# Patient Record
Sex: FEMALE | Race: ASIAN | NOT HISPANIC OR LATINO
[De-identification: names, ages, dates, MRNs, and addresses within clinical notes are randomized per-mention and may not be internally consistent; named-entity substitution may affect disease eponyms.]

---

## 2017-05-07 ENCOUNTER — HOSPITAL (OUTPATIENT)
Dept: OTHER | Age: 32
End: 2017-05-07
Attending: EMERGENCY MEDICINE

## 2017-05-07 LAB
ANALYZER ANC (IANC): ABNORMAL
ANION GAP SERPL CALC-SCNC: 20 MMOL/L (ref 10–20)
BASOPHILS # BLD: 0 THOUSAND/MCL (ref 0–0.3)
BASOPHILS NFR BLD: 0 %
BUN SERPL-MCNC: 6 MG/DL (ref 6–20)
BUN/CREAT SERPL: 8 (ref 7–25)
CALCIUM SERPL-MCNC: 9.3 MG/DL (ref 8.4–10.2)
CHLORIDE: 103 MMOL/L (ref 98–107)
CO2 SERPL-SCNC: 20 MMOL/L (ref 21–32)
CREAT SERPL-MCNC: 0.74 MG/DL (ref 0.51–0.95)
DIFFERENTIAL METHOD BLD: ABNORMAL
EOSINOPHIL # BLD: 0 THOUSAND/MCL (ref 0.1–0.5)
EOSINOPHIL NFR BLD: 0 %
ERYTHROCYTE [DISTWIDTH] IN BLOOD: 13.1 % (ref 11–15)
GLUCOSE SERPL-MCNC: 102 MG/DL (ref 65–99)
HCG POINT OF CARE (5HGRST): NEGATIVE
HEMATOCRIT: 36.4 % (ref 36–46.5)
HG POINT OF CARE QC: NORMAL
HGB BLD-MCNC: 12.5 GM/DL (ref 12–15.5)
LYMPHOCYTES # BLD: 1.5 THOUSAND/MCL (ref 1–4.8)
LYMPHOCYTES NFR BLD: 11 %
MCH RBC QN AUTO: 30 PG (ref 26–34)
MCHC RBC AUTO-ENTMCNC: 34.3 GM/DL (ref 32–36.5)
MCV RBC AUTO: 87.3 FL (ref 78–100)
MONOCYTES # BLD: 0.5 THOUSAND/MCL (ref 0.3–0.9)
MONOCYTES NFR BLD: 4 %
NEUTROPHILS # BLD: 10.9 THOUSAND/MCL (ref 1.8–7.7)
NEUTROPHILS NFR BLD: 85 %
NEUTS SEG NFR BLD: ABNORMAL %
PERCENT NRBC: ABNORMAL
PLATELET # BLD: 502 THOUSAND/MCL (ref 140–450)
POTASSIUM SERPL-SCNC: 4 MMOL/L (ref 3.4–5.1)
RBC # BLD: 4.17 MILLION/MCL (ref 4–5.2)
SODIUM SERPL-SCNC: 139 MMOL/L (ref 135–145)
WBC # BLD: 12.9 THOUSAND/MCL (ref 4.2–11)

## 2019-09-22 ENCOUNTER — HOSPITAL (OUTPATIENT)
Dept: OTHER | Age: 34
End: 2019-09-22

## 2019-09-22 LAB
ALBUMIN SERPL-MCNC: 4.3 G/DL (ref 3.6–5.1)
ALBUMIN/GLOB SERPL: 1.1 {RATIO} (ref 1–2.4)
ALP SERPL-CCNC: 46 UNITS/L (ref 45–117)
ALT SERPL-CCNC: 20 UNITS/L
ANALYZER ANC (IANC): ABNORMAL
ANION GAP SERPL CALC-SCNC: 12 MMOL/L (ref 10–20)
AST SERPL-CCNC: 18 UNITS/L
BASOPHILS # BLD: 0.1 K/MCL (ref 0–0.3)
BASOPHILS NFR BLD: 1 %
BILIRUB SERPL-MCNC: 0.6 MG/DL (ref 0.2–1)
BUN SERPL-MCNC: 8 MG/DL (ref 6–20)
BUN/CREAT SERPL: 10 (ref 7–25)
CALCIUM SERPL-MCNC: 9.6 MG/DL (ref 8.4–10.2)
CHLORIDE SERPL-SCNC: 106 MMOL/L (ref 98–107)
CO2 SERPL-SCNC: 25 MMOL/L (ref 21–32)
CREAT SERPL-MCNC: 0.81 MG/DL (ref 0.51–0.95)
DIFFERENTIAL METHOD BLD: ABNORMAL
EOSINOPHIL # BLD: 0.2 K/MCL (ref 0.1–0.5)
EOSINOPHIL NFR BLD: 2 %
ERYTHROCYTE [DISTWIDTH] IN BLOOD: 12.9 % (ref 11–15)
GLOBULIN SER-MCNC: 4 G/DL (ref 2–4)
GLUCOSE SERPL-MCNC: 90 MG/DL (ref 65–99)
HCG SERPL QL: NEGATIVE
HCT VFR BLD CALC: 39.9 % (ref 36–46.5)
HGB BLD-MCNC: 13.2 G/DL (ref 12–15.5)
IMM GRANULOCYTES # BLD AUTO: 0 K/MCL (ref 0–0.2)
IMM GRANULOCYTES NFR BLD: 0 %
LACTATE BLDV-SCNC: 0.8 MMOL/L (ref 0–2)
LIPASE SERPL-CCNC: 126 UNITS/L (ref 73–393)
LYMPHOCYTES # BLD: 2.7 K/MCL (ref 1–4.8)
LYMPHOCYTES NFR BLD: 30 %
MCH RBC QN AUTO: 29.1 PG (ref 26–34)
MCHC RBC AUTO-ENTMCNC: 33.1 G/DL (ref 32–36.5)
MCV RBC AUTO: 88.1 FL (ref 78–100)
MONOCYTES # BLD: 0.6 K/MCL (ref 0.3–0.9)
MONOCYTES NFR BLD: 6 %
NEUTROPHILS # BLD: 5.3 K/MCL (ref 1.8–7.7)
NEUTROPHILS NFR BLD: 61 %
NEUTS SEG NFR BLD: ABNORMAL %
NRBC (NRBCRE): 0 /100 WBC
PLATELET # BLD: 519 K/MCL (ref 140–450)
POTASSIUM SERPL-SCNC: 4 MMOL/L (ref 3.4–5.1)
PROT SERPL-MCNC: 8.3 G/DL (ref 6.4–8.2)
RBC # BLD: 4.53 MIL/MCL (ref 4–5.2)
SODIUM SERPL-SCNC: 139 MMOL/L (ref 135–145)
WBC # BLD: 8.8 K/MCL (ref 4.2–11)

## 2019-09-22 PROCEDURE — 99284 EMERGENCY DEPT VISIT MOD MDM: CPT | Performed by: EMERGENCY MEDICINE

## 2021-01-08 ENCOUNTER — TELEPHONE (OUTPATIENT)
Dept: OBGYN | Age: 36
End: 2021-01-08

## 2021-01-08 ENCOUNTER — OFFICE VISIT (OUTPATIENT)
Dept: OBGYN | Age: 36
End: 2021-01-08

## 2021-01-08 DIAGNOSIS — N90.89 VULVAR IRRITATION: Primary | ICD-10-CM

## 2021-01-08 PROCEDURE — 99203 OFFICE O/P NEW LOW 30 MIN: CPT | Performed by: OBSTETRICS & GYNECOLOGY

## 2021-01-08 RX ORDER — CLOBETASOL PROPIONATE 0.5 MG/G
OINTMENT TOPICAL
Qty: 30 G | Refills: 1 | Status: SHIPPED | OUTPATIENT
Start: 2021-01-08 | End: 2021-01-08 | Stop reason: DRUGHIGH

## 2021-01-08 RX ORDER — SPIRONOLACTONE 25 MG/1
1 TABLET ORAL
COMMUNITY
Start: 2019-05-14

## 2021-01-08 RX ORDER — TRETINOIN 0.05 G/100G
GEL TOPICAL
COMMUNITY
Start: 2020-11-09

## 2021-01-08 RX ORDER — CLOBETASOL PROPIONATE 0.5 MG/G
OINTMENT TOPICAL
Qty: 30 G | Refills: 1 | Status: SHIPPED | OUTPATIENT
Start: 2021-01-08

## 2021-01-08 RX ORDER — CLINDAMYCIN PHOSPHATE 10 MG/G
GEL TOPICAL
COMMUNITY
Start: 2019-05-13

## 2021-01-08 SDOH — HEALTH STABILITY: MENTAL HEALTH: HOW OFTEN DO YOU HAVE A DRINK CONTAINING ALCOHOL?: NEVER

## 2021-01-08 ASSESSMENT — PAIN SCALES - GENERAL: PAINLEVEL: 0

## 2021-05-25 VITALS
HEIGHT: 59 IN | RESPIRATION RATE: 16 BRPM | TEMPERATURE: 98.3 F | HEART RATE: 94 BPM | BODY MASS INDEX: 24.6 KG/M2 | SYSTOLIC BLOOD PRESSURE: 98 MMHG | WEIGHT: 122 LBS | OXYGEN SATURATION: 100 % | DIASTOLIC BLOOD PRESSURE: 62 MMHG

## 2023-09-01 ENCOUNTER — HOSPITAL ENCOUNTER (OUTPATIENT)
Dept: GENERAL RADIOLOGY | Facility: HOSPITAL | Age: 38
Discharge: HOME | End: 2023-09-01
Attending: INTERNAL MEDICINE
Payer: COMMERCIAL

## 2023-09-01 ENCOUNTER — OFFICE VISIT (OUTPATIENT)
Dept: RHEUMATOLOGY | Facility: CLINIC | Age: 38
End: 2023-09-01
Payer: COMMERCIAL

## 2023-09-01 ENCOUNTER — LAB ENCOUNTER (OUTPATIENT)
Dept: LAB | Age: 38
End: 2023-09-01
Attending: INTERNAL MEDICINE
Payer: COMMERCIAL

## 2023-09-01 VITALS
BODY MASS INDEX: 23.66 KG/M2 | SYSTOLIC BLOOD PRESSURE: 102 MMHG | HEIGHT: 59 IN | OXYGEN SATURATION: 99 % | DIASTOLIC BLOOD PRESSURE: 76 MMHG | TEMPERATURE: 98 F | WEIGHT: 117.38 LBS | RESPIRATION RATE: 16 BRPM | HEART RATE: 69 BPM

## 2023-09-01 DIAGNOSIS — G89.29 CHRONIC PAIN OF BOTH KNEES: ICD-10-CM

## 2023-09-01 DIAGNOSIS — M25.562 CHRONIC PAIN OF BOTH KNEES: ICD-10-CM

## 2023-09-01 DIAGNOSIS — H04.123 DRY EYES: ICD-10-CM

## 2023-09-01 DIAGNOSIS — R70.0 ELEVATED SED RATE: Primary | ICD-10-CM

## 2023-09-01 DIAGNOSIS — R79.82 ELEVATED C-REACTIVE PROTEIN (CRP): ICD-10-CM

## 2023-09-01 DIAGNOSIS — M53.3 CHRONIC SI JOINT PAIN: ICD-10-CM

## 2023-09-01 DIAGNOSIS — M25.561 CHRONIC PAIN OF BOTH KNEES: ICD-10-CM

## 2023-09-01 DIAGNOSIS — M54.2 NECK PAIN: ICD-10-CM

## 2023-09-01 DIAGNOSIS — G89.29 CHRONIC SI JOINT PAIN: ICD-10-CM

## 2023-09-01 DIAGNOSIS — R70.0 ELEVATED SED RATE: ICD-10-CM

## 2023-09-01 PROBLEM — M54.16 LUMBAR RADICULOPATHY: Status: ACTIVE | Noted: 2023-02-15

## 2023-09-01 PROBLEM — M51.26 LUMBAR DISC HERNIATION: Status: ACTIVE | Noted: 2022-12-22

## 2023-09-01 PROBLEM — M53.2X6 LUMBAR SPINE INSTABILITY: Status: ACTIVE | Noted: 2023-02-15

## 2023-09-01 PROBLEM — M54.41 CHRONIC BILATERAL LOW BACK PAIN WITH BILATERAL SCIATICA: Status: ACTIVE | Noted: 2023-02-15

## 2023-09-01 PROBLEM — R29.898 DECONDITIONED LOW BACK: Status: ACTIVE | Noted: 2023-02-15

## 2023-09-01 PROBLEM — M54.42 CHRONIC BILATERAL LOW BACK PAIN WITH BILATERAL SCIATICA: Status: ACTIVE | Noted: 2023-02-15

## 2023-09-01 PROBLEM — M43.16 SPONDYLOLISTHESIS AT L4-L5 LEVEL: Status: ACTIVE | Noted: 2023-02-15

## 2023-09-01 LAB
CK SERPL-CCNC: 128 U/L
CRP SERPL-MCNC: 0.42 MG/DL (ref ?–0.3)
ERYTHROCYTE [SEDIMENTATION RATE] IN BLOOD: 23 MM/HR

## 2023-09-01 PROCEDURE — 3074F SYST BP LT 130 MM HG: CPT | Performed by: INTERNAL MEDICINE

## 2023-09-01 PROCEDURE — 72202 X-RAY EXAM SI JOINTS 3/> VWS: CPT | Performed by: INTERNAL MEDICINE

## 2023-09-01 PROCEDURE — 3008F BODY MASS INDEX DOCD: CPT | Performed by: INTERNAL MEDICINE

## 2023-09-01 PROCEDURE — 36415 COLL VENOUS BLD VENIPUNCTURE: CPT

## 2023-09-01 PROCEDURE — 72052 X-RAY EXAM NECK SPINE 6/>VWS: CPT | Performed by: INTERNAL MEDICINE

## 2023-09-01 PROCEDURE — 83521 IG LIGHT CHAINS FREE EACH: CPT

## 2023-09-01 PROCEDURE — 82164 ANGIOTENSIN I ENZYME TEST: CPT

## 2023-09-01 PROCEDURE — 86334 IMMUNOFIX E-PHORESIS SERUM: CPT

## 2023-09-01 PROCEDURE — 84165 PROTEIN E-PHORESIS SERUM: CPT

## 2023-09-01 PROCEDURE — 82550 ASSAY OF CK (CPK): CPT

## 2023-09-01 PROCEDURE — 85652 RBC SED RATE AUTOMATED: CPT

## 2023-09-01 PROCEDURE — 71046 X-RAY EXAM CHEST 2 VIEWS: CPT | Performed by: INTERNAL MEDICINE

## 2023-09-01 PROCEDURE — 86038 ANTINUCLEAR ANTIBODIES: CPT

## 2023-09-01 PROCEDURE — 81374 HLA I TYPING 1 ANTIGEN LR: CPT

## 2023-09-01 PROCEDURE — 85549 MURAMIDASE: CPT

## 2023-09-01 PROCEDURE — 3078F DIAST BP <80 MM HG: CPT | Performed by: INTERNAL MEDICINE

## 2023-09-01 PROCEDURE — 86140 C-REACTIVE PROTEIN: CPT

## 2023-09-01 PROCEDURE — 99205 OFFICE O/P NEW HI 60 MIN: CPT | Performed by: INTERNAL MEDICINE

## 2023-09-01 RX ORDER — PANTOPRAZOLE SODIUM 40 MG/1
40 TABLET, DELAYED RELEASE ORAL
COMMUNITY
Start: 2023-06-05

## 2023-09-01 RX ORDER — CYCLOBENZAPRINE HCL 5 MG
1 TABLET ORAL NIGHTLY PRN
COMMUNITY
Start: 2022-07-09

## 2023-09-01 RX ORDER — ISOTRETINOIN 20 MG/1
20 CAPSULE, GELATIN COATED ORAL
COMMUNITY
Start: 2023-08-23

## 2023-09-01 RX ORDER — OMEGA-3 FATTY ACIDS/FISH OIL 300-1000MG
CAPSULE ORAL
COMMUNITY

## 2023-09-05 LAB
ACE: 28 U/L
NUCLEAR IGG TITR SER IF: NEGATIVE {TITER}

## 2023-09-06 LAB
ALBUMIN SERPL ELPH-MCNC: 4.62 G/DL (ref 3.75–5.21)
ALBUMIN/GLOB SERPL: 1.66 {RATIO} (ref 1–2)
ALPHA1 GLOB SERPL ELPH-MCNC: 0.26 G/DL (ref 0.19–0.46)
ALPHA2 GLOB SERPL ELPH-MCNC: 0.73 G/DL (ref 0.48–1.05)
B-GLOBULIN SERPL ELPH-MCNC: 0.8 G/DL (ref 0.68–1.23)
GAMMA GLOB SERPL ELPH-MCNC: 1 G/DL (ref 0.62–1.7)
KAPPA LC FREE SER-MCNC: 1.39 MG/DL (ref 0.33–1.94)
KAPPA LC FREE/LAMBDA FREE SER NEPH: 1.42 {RATIO} (ref 0.26–1.65)
LAMBDA LC FREE SERPL-MCNC: 0.98 MG/DL (ref 0.57–2.63)
LYSOZYME: 4 UG/ML
PROT SERPL-MCNC: 7.4 G/DL (ref 6.4–8.2)

## 2023-09-11 LAB — HLA-B27: NEGATIVE

## 2023-09-13 ENCOUNTER — TELEPHONE (OUTPATIENT)
Dept: RHEUMATOLOGY | Facility: CLINIC | Age: 38
End: 2023-09-13

## 2023-09-13 DIAGNOSIS — M51.36 DDD (DEGENERATIVE DISC DISEASE), LUMBAR: ICD-10-CM

## 2023-09-13 DIAGNOSIS — M50.30 DDD (DEGENERATIVE DISC DISEASE), CERVICAL: ICD-10-CM

## 2023-09-13 DIAGNOSIS — Z86.2 HISTORY OF THROMBOCYTOSIS: ICD-10-CM

## 2023-09-13 DIAGNOSIS — R70.0 ELEVATED SED RATE: Primary | ICD-10-CM

## 2024-03-13 ENCOUNTER — OFFICE VISIT (OUTPATIENT)
Dept: RHEUMATOLOGY | Facility: CLINIC | Age: 39
End: 2024-03-13
Payer: COMMERCIAL

## 2024-03-13 VITALS
TEMPERATURE: 98 F | RESPIRATION RATE: 18 BRPM | WEIGHT: 116.38 LBS | DIASTOLIC BLOOD PRESSURE: 74 MMHG | OXYGEN SATURATION: 92 % | BODY MASS INDEX: 23.46 KG/M2 | SYSTOLIC BLOOD PRESSURE: 102 MMHG | HEART RATE: 80 BPM | HEIGHT: 59 IN

## 2024-03-13 DIAGNOSIS — M50.30 DDD (DEGENERATIVE DISC DISEASE), CERVICAL: Primary | ICD-10-CM

## 2024-03-13 DIAGNOSIS — Z86.2 HISTORY OF THROMBOCYTOSIS: ICD-10-CM

## 2024-03-13 DIAGNOSIS — M25.562 CHRONIC PAIN OF BOTH KNEES: ICD-10-CM

## 2024-03-13 DIAGNOSIS — H04.123 DRY EYES: ICD-10-CM

## 2024-03-13 DIAGNOSIS — G89.29 CHRONIC PAIN OF BOTH KNEES: ICD-10-CM

## 2024-03-13 DIAGNOSIS — M25.561 CHRONIC PAIN OF BOTH KNEES: ICD-10-CM

## 2024-03-13 DIAGNOSIS — M51.36 DDD (DEGENERATIVE DISC DISEASE), LUMBAR: ICD-10-CM

## 2024-03-13 PROCEDURE — 99215 OFFICE O/P EST HI 40 MIN: CPT | Performed by: INTERNAL MEDICINE

## 2024-03-13 RX ORDER — TIZANIDINE 4 MG/1
4 TABLET ORAL NIGHTLY PRN
Qty: 30 TABLET | Refills: 0 | Status: SHIPPED | OUTPATIENT
Start: 2024-03-13

## 2024-03-13 NOTE — PROGRESS NOTES
?  RHEUMATOLOGY FOLLOW UP   Date of visit: 03/13/2024  ?  Chief Complaint   Patient presents with    Follow - Up     6M follow up. Right eye hurts and swollen sometimes, since 2M ago. No improvement, no change. Knees are a little better, but not back, Voltaren gel helps for 10-15 days. Would like a dosage increase for the cyclobenzaprine.        ASSESSMENT, DISCUSSION & PLAN   Assessment:  1. DDD (degenerative disc disease), cervical    2. DDD (degenerative disc disease), lumbar    3. History of thrombocytosis    4. Dry eyes    5. Chronic pain of both knees        Discussion:  Ms. Sammi Woods is a 39 yo woman, otherwise fairly healthy who started to develop back pain and has since developed some knee pain and neck pain. She has been diagnosed with DDD of the lower back. As part of her workup, she was found to have elevated inflammatory markers- particularly the sed rate. She has had elevated platelet counts and workup through hematology was negative. Her exam lacks overt signs of synovitis but given her history and labs, it was worth doing a more extensive autoimmune work up. Her testing including HLAB27 was negative.  Since being off accutane, she continues to have back and neck pain. The knee pain is improved with topical OTC voltaren gel.   Her inflammatory markers have normalized yet she continues with leukocytosis and thrombocytosis. Recommended she follow back with hematology regarding this since unlikely autoimmune/inflammatory related  For the pain, she will continue ibuprofen prn.  Will try tizanidine instead of cyclobenzaprine before considering increasing cyclobenzaprine to 7.5mg.   She will consider PM&R evaluation for injections.  Otherwise, recommended she consider low dose duloxetine for help with the chronic pain.  Discussed other dietary changes and supplements that may help with her symptoms overall.   Will also try compounded topical with diclofenac, lidocaine and cyclobenzaprine to see if this  makes a difference.   She will follow up in 6 months or sooner.   Encouraged her to reach out in the meantime with updates.     Patient verbalized understanding of above instructions. No further questions at this time.    Code selection for this visit was based on time spent (40min) on date of service in preparing to see the patient, obtaining and/or reviewing separately obtained history, performing a medically appropriate examination, counseling and educating the patient/family/caregiver, ordering medications or testing, referring and communicating with other healthcare providers, documenting clinical information in the E HR, independently interpreting results and communicating results to the patient/family/caregiver and care coordination with the patient's other providers.    ?  Plan:  Diagnoses and all orders for this visit:    DDD (degenerative disc disease), cervical  -     tiZANidine 4 MG Oral Tab; Take 1 tablet (4 mg total) by mouth nightly as needed.  -     Physical Therapy Referral - [facility]    DDD (degenerative disc disease), lumbar  -     tiZANidine 4 MG Oral Tab; Take 1 tablet (4 mg total) by mouth nightly as needed.    History of thrombocytosis    Dry eyes    Chronic pain of both knees  -     tiZANidine 4 MG Oral Tab; Take 1 tablet (4 mg total) by mouth nightly as needed.          Return in about 6 months (around 9/13/2024).  ?  HPI   Sammi Woods is a 38 year old female with the following active problems who is referred for rheumatologic evaluation due to joint pain and elevated sed rate.    Since her last visit, she feels about the same  Completed accutane at the beginning of January.  Had visited [facility] with worsened lower back pain while visit. Saw a provider there took prednisone 40mg daily x 7 days which helped with the lower back     Feels her knee pain is better overall. Applies voltaren gel 3-4x/day and feels help.  Continues with neck pain  Only taking max 12 tabs per month  Only  taking 3 cyclobenzaprine per month. Denies drowsiness with the medication.     Denies any other joint pain or swelling.   Has noticed some eye lid swelling- happening 6 times over the past 2 months.   Continues with dry eyes and using drops  Continues with hair loss but thinks related to accutane    HPI from initial consultation  referred for rheumatologic evaluation due to joint pain and elevated sed rate.    Developed thrombocytosis a few years ago. They were persistently elevated the following year. As part of the work up, she was found to have elevated CRP and ESR. Was referred to hematology in 2021, work up was negative except persistent elevations but told to follow up with rheumatology.     Hx of chronic low back pain, aggravated when bowling in December of 2021. Had been recommended to have lumbar fusion by multiple surgeons.     Was seen by rheumatology at [place] and told related to the back and did not recommend any further intervention.    In October of 2022, she started to developed some neck pain. Seen by PCP and xrays were normal. Went through PT which some improvements.     Started on accutane 4 months ago for cystic acne. On 20mg and only recently increase to 40mg. States skin worsened 3-4 years ago.   Did have some some rudy-menstrual cycle increased depression and irritability. Very painful period. Does not follow gyne regularly. Has plans to complete accutane after a total of 6 months.   Is having worsened neck pain now. And should shoulder pain but feels more muscular   Developed bilateral knee pain, xrays showed mild OA changes in medial compartment and patellofemoral. Did PT which helped slightly  Is also applying voltaren gel as needed. Denies obvious swelling of the joints. Notes cracking of the left knee in the mornings which helps alleviate the pain.     In the past, for the lower back, she took two rounds of oral steroids as well as injection which helped for some time. Last  injection in March, typically lasts for 7-8m.   Took naproxen at one point which helped.  Now taking ibuprofen as needed but does help.     + morning stiffness in the neck/back- back improves with activity and heating pad, improved with activity, lasts for about 20 minutes.   + sciatica and nerve pain in the legs.   + hair loss/thinning, relates to accutane.   + some cramping and diarrhea, again thought relates to increase in accutane dose. No blood or mucous in stools. No active or daily now.   + prior reflux with taking the other NSAIDs, takes PPI as needed, last took 3-4 months ago for 2 weeks then stopped.   + first canker sore after the GI upset   + dry eyes, worsened lately. Now using systane drops which help as well as gel at night.   + pain can wake from sleep; but also has some difficulty falling asleep.     Denies new skin nodule formation.  The patient denies chronic oral or nasal ulcers, photosensitive rash, elevated or scarring rashes, Raynaud's phenomenon, prior hematologic abnormality (aside from the elevated platelets), prior renal or liver disease, or history of seizures.  Denies hx of pericarditis or pleuritis.   No history of prior blood clot in the legs or lungs, strokes or ischemic phenomenon, or prior miscarriages.  Denies puffy hands, tightening of the skin, nonhealing ulcers on the fingertips, trouble swallowing, or severe acid reflux.  The patient denies any history of uveitis, crampy abdominal pain, constipation, diarrhea, bloody stools, mucus in stools, nodular painful shin bruises, Achilles heel pain or symptoms of enthesitis, psoriatic lesions, spooning or pitting of the nails, or history of dactylitis.  There are no symptoms of severe dry mouth, recurrent cavities, or swelling of the cheeks or under the jawbone.   No fevers, chills, lymphadenopathy, night sweats, unexpected weight loss, easy bruising or bleeding.  Denies chronic sinus infections/disease or epistaxis.  Denies chronic  cough or hemoptysis.     Works from home- has a standing desk.   Tries to walk dailly- typically at least 1 mile twice daily. Does also biking and yoga. Still has pain but is pushing through pain.     Family hx:   Mother with OA (both knees replaced)  Father with MI at age 42 and DDD of lumbar spine    Past Medical History:  History reviewed. No pertinent past medical history.  Past Surgical History:  Past Surgical History:   Procedure Laterality Date          x 2     Family History:  Family History   Problem Relation Age of Onset    Arthritis Mother     Hyperlipidemia Mother     Heart Attack Father     Hypertension Father     Hyperlipidemia Father     Arthritis Father      Social History:  Social History     Socioeconomic History    Marital status:    Tobacco Use    Smoking status: Never    Smokeless tobacco: Never   Vaping Use    Vaping Use: Never used   Substance and Sexual Activity    Alcohol use: Never    Drug use: Never     Medications:  Outpatient Medications Marked as Taking for the 3/13/24 encounter (Office Visit) with Ana Cheng DO   Medication Sig Dispense Refill    tretinoin 0.025 % External Cream       tiZANidine 4 MG Oral Tab Take 1 tablet (4 mg total) by mouth nightly as needed. 30 tablet 0    Ibuprofen 200 MG Oral Cap As needed      pantoprazole 40 MG Oral Tab EC Take 1 tablet (40 mg total) by mouth.       Modified Medications    No medications on file     Medications Discontinued During This Encounter   Medication Reason    ACCUTANE 20 MG Oral Cap     cyclobenzaprine 5 MG Oral Tab      ??  Allergies:  No Known Allergies  ?  REVIEW OF SYSTEMS   ?  Review of Systems   Constitutional:  Positive for malaise/fatigue. Negative for chills and fever.   HENT:  Negative for congestion and nosebleeds.    Eyes:  Positive for pain. Negative for blurred vision and redness.   Respiratory:  Negative for cough, hemoptysis and shortness of breath.    Cardiovascular:  Negative for chest pain,  palpitations and leg swelling.   Gastrointestinal:  Positive for heartburn (intermittently). Negative for abdominal pain, blood in stool, constipation, diarrhea and nausea.   Genitourinary:  Negative for dysuria, frequency, hematuria and urgency.   Musculoskeletal:  Positive for back pain, joint pain, myalgias and neck pain.   Skin:  Negative for itching and rash.   Neurological:  Positive for tingling (sciatcia). Negative for dizziness, seizures, weakness and headaches.   Endo/Heme/Allergies:  Positive for environmental allergies. Does not bruise/bleed easily.   Psychiatric/Behavioral:  Negative for depression. The patient has insomnia (not always due to the pain). The patient is not nervous/anxious.      PHYSICAL EXAM   Today's Vitals:  Temperature Blood Pressure Heart Rate Resp Rate SpO2   Temp: 97.5 °F (36.4 °C) BP: 102/74 Pulse: 80 Resp: 18 SpO2: 92 %   ?  Current Weight Height BMI BSA Pain   Wt Readings from Last 1 Encounters:   03/13/24 116 lb 6.4 oz (52.8 kg)    Height: 4' 11\" (149.9 cm) Body mass index is 23.51 kg/m². Body surface area is 1.47 meters squared.         Physical Exam  Vitals and nursing note reviewed.   Constitutional:       General: She is not in acute distress.     Appearance: Normal appearance. She is well-developed. She is not diaphoretic.   HENT:      Head: Normocephalic.   Eyes:      General: No scleral icterus.     Extraocular Movements: Extraocular movements intact.      Conjunctiva/sclera: Conjunctivae normal.   Neck:      Vascular: No JVD.      Trachea: No tracheal deviation.   Cardiovascular:      Rate and Rhythm: Normal rate and regular rhythm.      Heart sounds: Normal heart sounds. No murmur heard.  Pulmonary:      Effort: Pulmonary effort is normal. No respiratory distress.      Breath sounds: Normal breath sounds. No wheezing.   Musculoskeletal:         General: No swelling, tenderness or deformity.      Cervical back: Neck supple.      Comments: No swelling, tenderness,  redness or restriction of motion of the DIPs, PIPs, MCPs, wrists, elbows, ankles, or joints of the feet.  Bilateral shoulders with full ROM, no evidence of impingement with provocative maneuvers.  Bilateral knees without medial joint line tenderness, no crepitus, no effusion.  (Prior exam-SI joints tender. No tenderness over the greater trochanters. Spinous process tenderness over cervial and lumbar spine. Significant paraspinal muscle tightness/tenderness)   Lymphadenopathy:      Cervical: No cervical adenopathy.   Skin:     General: Skin is warm and dry.      Findings: No erythema or rash.      Comments: No malar rash  No periungal erythema  No fingernail pitting/onycholysis    Neurological:      Mental Status: She is alert and oriented to person, place, and time.      Cranial Nerves: No cranial nerve deficit.      Gait: Gait normal.   Psychiatric:         Mood and Affect: Mood normal.         Behavior: Behavior normal.       ?  Radiology review:      Labs:  No results found for: \"WBC\", \"RBC\", \"HGB\", \"HCT\", \"PLT\", \"MPV\", \"MCV\", \"MCH\", \"MCHC\", \"RDW\", \"NEPRELIM\", \"NEUTABS\", \"LYMPHABS\", \"EOSABS\", \"BASABS\", \"NEUT\", \"LYMPH\", \"MON\", \"EOS\", \"BASO\", \"NEPERCENT\", \"LYPERCENT\", \"MOPERCENT\", \"EOPERCENT\", \"BAPERCENT\", \"NE\", \"LYMABS\", \"MOABSO\", \"EOABSO\", \"BAABSO\"  Lab Results   Component Value Date    TP 7.4 09/01/2023    ALB 4.62 09/01/2023       Additional Labs:  3/2024  CBC with WBC 12.0; hemoglobin 11.7; platelet 553  CMP grossly normal  ESR 7 normal  CRP 2 normal (n<10)    09/01/2023  SPEP grossly negative  HLA-B27 negative  HODA screen negative  Serum ACE normal  Lysozyme normal   normal  ESR 23 borderline elevated  CRP 0.42 borderline elevated    03/2023  CRP 5.1 (N<10)  ESR 39 elevated  Uric acid 4.3 normal   RF negative   B12 385 normal   Vit D 35.3 normal   HODA neg but on IF cytoplasmic autoantibodies present     07/2022  ESR 22 borderline elevated  HODA screen negative   Uric acid 4.6 normal   RF negative      06/2022  ESR 45 elevated  CRP 14.9 (N<10) elevated    06/2022  ESR 44 elevated  CRP 16.1 (N<10) elevated    Ana Cheng DO  [facility]  03/13/2024

## 2024-03-20 ENCOUNTER — PATIENT MESSAGE (OUTPATIENT)
Dept: RHEUMATOLOGY | Facility: CLINIC | Age: 39
End: 2024-03-20

## 2024-03-20 DIAGNOSIS — M54.2 NECK PAIN: ICD-10-CM

## 2024-03-20 DIAGNOSIS — M50.30 DDD (DEGENERATIVE DISC DISEASE), CERVICAL: Primary | ICD-10-CM

## 2024-03-20 DIAGNOSIS — M51.36 DDD (DEGENERATIVE DISC DISEASE), LUMBAR: ICD-10-CM

## 2024-03-20 RX ORDER — CYCLOBENZAPRINE HYDROCHLORIDE 7.5 MG/1
7.5 TABLET, FILM COATED ORAL NIGHTLY PRN
Qty: 90 TABLET | Refills: 0 | Status: SHIPPED | OUTPATIENT
Start: 2024-03-20 | End: 2024-03-21

## 2024-03-21 ENCOUNTER — TELEPHONE (OUTPATIENT)
Dept: RHEUMATOLOGY | Facility: CLINIC | Age: 39
End: 2024-03-21

## 2024-03-21 DIAGNOSIS — M54.2 NECK PAIN: ICD-10-CM

## 2024-03-21 DIAGNOSIS — M50.30 DDD (DEGENERATIVE DISC DISEASE), CERVICAL: ICD-10-CM

## 2024-03-21 DIAGNOSIS — M51.36 DDD (DEGENERATIVE DISC DISEASE), LUMBAR: ICD-10-CM

## 2024-03-21 RX ORDER — CYCLOBENZAPRINE HCL 10 MG
10 TABLET ORAL NIGHTLY PRN
Qty: 30 TABLET | Refills: 0 | Status: SHIPPED | OUTPATIENT
Start: 2024-03-21

## 2024-03-21 NOTE — TELEPHONE ENCOUNTER
Call from [id number], Cyclobenzaprine is not covered by health plan. Must change prescription to 5mg or 10mg tablets.

## 2024-09-25 ENCOUNTER — TELEPHONE (OUTPATIENT)
Dept: RHEUMATOLOGY | Facility: CLINIC | Age: 39
End: 2024-09-25

## 2024-09-25 DIAGNOSIS — M25.561 CHRONIC PAIN OF BOTH KNEES: ICD-10-CM

## 2024-09-25 DIAGNOSIS — R70.0 ELEVATED SED RATE: Primary | ICD-10-CM

## 2024-09-25 DIAGNOSIS — M25.562 CHRONIC PAIN OF BOTH KNEES: ICD-10-CM

## 2024-09-25 DIAGNOSIS — G89.29 CHRONIC PAIN OF BOTH KNEES: ICD-10-CM

## 2024-09-25 DIAGNOSIS — R79.82 ELEVATED C-REACTIVE PROTEIN (CRP): ICD-10-CM

## 2024-09-25 DIAGNOSIS — Z86.2 HISTORY OF THROMBOCYTOSIS: ICD-10-CM

## 2024-09-30 ENCOUNTER — LAB ENCOUNTER (OUTPATIENT)
Dept: LAB | Age: 39
End: 2024-09-30
Attending: INTERNAL MEDICINE
Payer: COMMERCIAL

## 2024-09-30 DIAGNOSIS — Z86.2 HISTORY OF THROMBOCYTOSIS: ICD-10-CM

## 2024-09-30 DIAGNOSIS — M25.561 CHRONIC PAIN OF BOTH KNEES: ICD-10-CM

## 2024-09-30 DIAGNOSIS — G89.29 CHRONIC PAIN OF BOTH KNEES: ICD-10-CM

## 2024-09-30 DIAGNOSIS — R70.0 ELEVATED SED RATE: ICD-10-CM

## 2024-09-30 DIAGNOSIS — M25.562 CHRONIC PAIN OF BOTH KNEES: ICD-10-CM

## 2024-09-30 DIAGNOSIS — R79.82 ELEVATED C-REACTIVE PROTEIN (CRP): ICD-10-CM

## 2024-09-30 LAB
ALBUMIN SERPL-MCNC: 4.9 G/DL (ref 3.2–4.8)
ALBUMIN/GLOB SERPL: 1.9 {RATIO} (ref 1–2)
ALP LIVER SERPL-CCNC: 39 U/L
ALT SERPL-CCNC: 9 U/L
ANION GAP SERPL CALC-SCNC: 6 MMOL/L (ref 0–18)
AST SERPL-CCNC: 15 U/L (ref ?–34)
BASOPHILS # BLD AUTO: 0.08 X10(3) UL (ref 0–0.2)
BASOPHILS NFR BLD AUTO: 0.9 %
BILIRUB SERPL-MCNC: 0.4 MG/DL (ref 0.3–1.2)
BUN BLD-MCNC: 14 MG/DL (ref 9–23)
CALCIUM BLD-MCNC: 10.3 MG/DL (ref 8.7–10.4)
CHLORIDE SERPL-SCNC: 105 MMOL/L (ref 98–112)
CO2 SERPL-SCNC: 26 MMOL/L (ref 21–32)
CREAT BLD-MCNC: 0.85 MG/DL
CRP SERPL-MCNC: <0.4 MG/DL (ref ?–0.5)
EGFRCR SERPLBLD CKD-EPI 2021: 89 ML/MIN/1.73M2 (ref 60–?)
EOSINOPHIL # BLD AUTO: 0.17 X10(3) UL (ref 0–0.7)
EOSINOPHIL NFR BLD AUTO: 2 %
ERYTHROCYTE [DISTWIDTH] IN BLOOD BY AUTOMATED COUNT: 13.2 %
ERYTHROCYTE [SEDIMENTATION RATE] IN BLOOD: 13 MM/HR
FASTING STATUS PATIENT QL REPORTED: NO
GLOBULIN PLAS-MCNC: 2.6 G/DL (ref 2–3.5)
GLUCOSE BLD-MCNC: 94 MG/DL (ref 70–99)
HCT VFR BLD AUTO: 35.3 %
HGB BLD-MCNC: 12.2 G/DL
IMM GRANULOCYTES # BLD AUTO: 0.02 X10(3) UL (ref 0–1)
IMM GRANULOCYTES NFR BLD: 0.2 %
LYMPHOCYTES # BLD AUTO: 2.86 X10(3) UL (ref 1–4)
LYMPHOCYTES NFR BLD AUTO: 32.8 %
MCH RBC QN AUTO: 29.9 PG (ref 26–34)
MCHC RBC AUTO-ENTMCNC: 34.6 G/DL (ref 31–37)
MCV RBC AUTO: 86.5 FL
MONOCYTES # BLD AUTO: 0.53 X10(3) UL (ref 0.1–1)
MONOCYTES NFR BLD AUTO: 6.1 %
NEUTROPHILS # BLD AUTO: 5.05 X10 (3) UL (ref 1.5–7.7)
NEUTROPHILS # BLD AUTO: 5.05 X10(3) UL (ref 1.5–7.7)
NEUTROPHILS NFR BLD AUTO: 58 %
OSMOLALITY SERPL CALC.SUM OF ELEC: 284 MOSM/KG (ref 275–295)
PLATELET # BLD AUTO: 508 10(3)UL (ref 150–450)
POTASSIUM SERPL-SCNC: 4.1 MMOL/L (ref 3.5–5.1)
PROT SERPL-MCNC: 7.5 G/DL (ref 5.7–8.2)
RBC # BLD AUTO: 4.08 X10(6)UL
SODIUM SERPL-SCNC: 137 MMOL/L (ref 136–145)
WBC # BLD AUTO: 8.7 X10(3) UL (ref 4–11)

## 2024-09-30 PROCEDURE — 85025 COMPLETE CBC W/AUTO DIFF WBC: CPT

## 2024-09-30 PROCEDURE — 85652 RBC SED RATE AUTOMATED: CPT

## 2024-09-30 PROCEDURE — 36415 COLL VENOUS BLD VENIPUNCTURE: CPT

## 2024-09-30 PROCEDURE — 80053 COMPREHEN METABOLIC PANEL: CPT

## 2024-09-30 PROCEDURE — 86140 C-REACTIVE PROTEIN: CPT

## 2024-10-02 ENCOUNTER — OFFICE VISIT (OUTPATIENT)
Dept: RHEUMATOLOGY | Facility: CLINIC | Age: 39
End: 2024-10-02
Payer: COMMERCIAL

## 2024-10-02 VITALS
TEMPERATURE: 98 F | OXYGEN SATURATION: 99 % | SYSTOLIC BLOOD PRESSURE: 92 MMHG | DIASTOLIC BLOOD PRESSURE: 60 MMHG | WEIGHT: 113 LBS | BODY MASS INDEX: 22.78 KG/M2 | HEIGHT: 59 IN | HEART RATE: 81 BPM | RESPIRATION RATE: 18 BRPM

## 2024-10-02 DIAGNOSIS — M25.562 CHRONIC PAIN OF BOTH KNEES: ICD-10-CM

## 2024-10-02 DIAGNOSIS — Z86.2 HISTORY OF THROMBOCYTOSIS: ICD-10-CM

## 2024-10-02 DIAGNOSIS — M25.561 CHRONIC PAIN OF BOTH KNEES: ICD-10-CM

## 2024-10-02 DIAGNOSIS — M51.362 DEGENERATION OF INTERVERTEBRAL DISC OF LUMBAR REGION WITH DISCOGENIC BACK PAIN AND LOWER EXTREMITY PAIN: Primary | ICD-10-CM

## 2024-10-02 DIAGNOSIS — H04.123 DRY EYES: ICD-10-CM

## 2024-10-02 DIAGNOSIS — G89.29 CHRONIC PAIN OF BOTH KNEES: ICD-10-CM

## 2024-10-02 DIAGNOSIS — M50.30 DDD (DEGENERATIVE DISC DISEASE), CERVICAL: ICD-10-CM

## 2024-10-02 PROCEDURE — 99214 OFFICE O/P EST MOD 30 MIN: CPT | Performed by: INTERNAL MEDICINE

## 2024-10-02 RX ORDER — SPIRONOLACTONE 25 MG/1
25 TABLET ORAL DAILY
COMMUNITY

## 2024-10-02 NOTE — PROGRESS NOTES
?  RHEUMATOLOGY FOLLOW UP   Date of visit: 10/02/2024  ?  Chief Complaint   Patient presents with    Follow - Up     DDD 6 month follow up patient states follow up due to markers were high   Rapid 3 score: 3.3       ASSESSMENT, DISCUSSION & PLAN   Assessment:  1. Degeneration of intervertebral disc of lumbar region with discogenic back pain and lower extremity pain    2. DDD (degenerative disc disease), cervical    3. Chronic pain of both knees    4. History of thrombocytosis    5. Dry eyes        Discussion:  Ms. Sammi Woods is a 39 yo woman, otherwise fairly healthy who started to develop back pain and has since developed some knee pain and neck pain. She has been diagnosed with DDD of the lower back. As part of her workup, she was found to have elevated inflammatory markers- particularly the sed rate. She has had elevated platelet counts and workup through hematology was negative. Her exam lacks overt signs of synovitis but given her history and labs, it was worth doing a more extensive autoimmune work up. Her testing including HLAB27 was negative.  Since being off accutane, she continues to have back and neck pain. The knee pain is improved with topical OTC voltaren gel.   Her inflammatory markers have normalized. Her leukocytosis has resolved but still has thrombocytosis which has irmproved. Previously recommended she follow back with hematology regarding this since unlikely autoimmune/inflammatory related  For the pain, she will continue ibuprofen prn.  Continue cyclobenzaprine 10mg as needed (had side effects with tizanidine of feeling weak and as if she was breathing slowly which triggered some anxiety).   She may want to consider low dose antidepressant in the future but pt feels like her pain overall is manageable. Is going to avoid injections or surgery for now but open to this in the future.  Will try compounded topical with diclofenac, lidocaine and cyclobenzaprine to see if this makes a difference.  She can also consider a topical CBD/THC cream to see if the topical formulation can help without altering mentation.   Okay to follow up in one year but encouraged her to reach out if symptoms worsen. Also with update on if topical is helping.   Encouraged her to reach out in the meantime with updates.   Also, pt encouraged to consider alternative medicine for pain relief.     Patient verbalized understanding of above instructions. No further questions at this time.    Code selection for this visit was based on time spent (30min) on date of service in preparing to see the patient, obtaining and/or reviewing separately obtained history, performing a medically appropriate examination, counseling and educating the patient/family/caregiver, ordering medications or testing, referring and communicating with other healthcare providers, documenting clinical information in the E HR, independently interpreting results and communicating results to the patient/family/caregiver and care coordination with the patient's other providers.    ?  Plan:  Diagnoses and all orders for this visit:    Degeneration of intervertebral disc of lumbar region with discogenic back pain and lower extremity pain    DDD (degenerative disc disease), cervical    Chronic pain of both knees    History of thrombocytosis    Dry eyes            Return in about 1 year (around 10/2/2025).  ?  HPI   Sammi Woods is a 38 year old female with the following active problems who is referred for rheumatologic evaluation due to joint pain and elevated sed rate.    Since her last visit, she feels about the same  Continues with low back pain. Continues with PT exercises taught. Does about 5-6x/week.   Still cannot walk more than a mile.   Still with sciatica bilaterally.   Had done injections over the years.   Tries to avoid medication if able- limits to about 15/month  Has tried CBD topically.     Feels her knee pain is better overall. Applies voltaren gel 3-4x/day  and feels help. Hasn't helped for the back. Can hepl with the occasional neck and shoulder pain.   Only taking max 12 tabs per month  Only taking once weekly cyclobenzaprine.    Denies any other joint pain or swelling.   Denies skin rashes.   Warm compresses has been helping with eye lid swelling and dry eyes (not using drops)  Denies dry mouth  Continues with hair loss but thought related to Accutane. Started on spironolactone 25mg daily to see if help with hair loss.     HPI from initial consultation  referred for rheumatologic evaluation due to joint pain and elevated sed rate.    Developed thrombocytosis a few years ago. They were persistently elevated the following year. As part of the work up, she was found to have elevated CRP and ESR. Was referred to hematology in 2021, work up was negative except persistent elevations but told to follow up with rheumatology.     Hx of chronic low back pain, aggravated when bowling in December of 2021. Had been recommended to have lumbar fusion by multiple surgeons.     Was seen by rheumatology at [place] and told related to the back and did not recommend any further intervention.    In October of 2022, she started to developed some neck pain. Seen by PCP and xrays were normal. Went through PT which some improvements.     Started on accutane 4 months ago for cystic acne. On 20mg and only recently increase to 40mg. States skin worsened 3-4 years ago.   Did have some some rudy-menstrual cycle increased depression and irritability. Very painful period. Does not follow gyne regularly. Has plans to complete accutane after a total of 6 months.   Is having worsened neck pain now. And should shoulder pain but feels more muscular   Developed bilateral knee pain, xrays showed mild OA changes in medial compartment and patellofemoral. Did PT which helped slightly  Is also applying voltaren gel as needed. Denies obvious swelling of the joints. Notes cracking of the left knee in  the mornings which helps alleviate the pain.     In the past, for the lower back, she took two rounds of oral steroids as well as injection which helped for some time. Last injection in March, typically lasts for 7-8m.   Took naproxen at one point which helped.  Now taking ibuprofen as needed but does help.     + morning stiffness in the neck/back- back improves with activity and heating pad, improved with activity, lasts for about 20 minutes.   + sciatica and nerve pain in the legs.   + hair loss/thinning, relates to accutane.   + some cramping and diarrhea, again thought relates to increase in accutane dose. No blood or mucous in stools. No active or daily now.   + prior reflux with taking the other NSAIDs, takes PPI as needed, last took 3-4 months ago for 2 weeks then stopped.   + first canker sore after the GI upset   + dry eyes, worsened lately. Now using systane drops which help as well as gel at night.   + pain can wake from sleep; but also has some difficulty falling asleep.     Denies new skin nodule formation.  The patient denies chronic oral or nasal ulcers, photosensitive rash, elevated or scarring rashes, Raynaud's phenomenon, prior hematologic abnormality (aside from the elevated platelets), prior renal or liver disease, or history of seizures.  Denies hx of pericarditis or pleuritis.   No history of prior blood clot in the legs or lungs, strokes or ischemic phenomenon, or prior miscarriages.  Denies puffy hands, tightening of the skin, nonhealing ulcers on the fingertips, trouble swallowing, or severe acid reflux.  The patient denies any history of uveitis, crampy abdominal pain, constipation, diarrhea, bloody stools, mucus in stools, nodular painful shin bruises, Achilles heel pain or symptoms of enthesitis, psoriatic lesions, spooning or pitting of the nails, or history of dactylitis.  There are no symptoms of severe dry mouth, recurrent cavities, or swelling of the cheeks or under the jawbone.   No  fevers, chills, lymphadenopathy, night sweats, unexpected weight loss, easy bruising or bleeding.  Denies chronic sinus infections/disease or epistaxis.  Denies chronic cough or hemoptysis.     Works from home- has a standing desk.   Tries to walk dailly- typically at least 1 mile twice daily. Does also biking and yoga. Still has pain but is pushing through pain.     Family hx:   Mother with OA (both knees replaced)  Father with MI at age 42 and DDD of lumbar spine    Past Medical History:  History reviewed. No pertinent past medical history.  Past Surgical History:  Past Surgical History:   Procedure Laterality Date          x 2     Family History:  Family History   Problem Relation Age of Onset    Arthritis Mother     Hyperlipidemia Mother     Heart Attack Father     Hypertension Father     Hyperlipidemia Father     Arthritis Father      Social History:  Social History     Socioeconomic History    Marital status:    Tobacco Use    Smoking status: Never    Smokeless tobacco: Never   Vaping Use    Vaping status: Never Used   Substance and Sexual Activity    Alcohol use: Never    Drug use: Never     Social Determinants of Health      [facility]    Housing Stability     Medications:  Outpatient Medications Marked as Taking for the 10/2/24 encounter (Office Visit) with Ana Cheng DO   Medication Sig Dispense Refill    spironolactone 25 MG Oral Tab Take 1 tablet (25 mg total) by mouth daily.      Cyclobenzaprine HCl 10 MG Oral Tab Take 1 tablet (10 mg total) by mouth nightly as needed. 30 tablet 0    Ibuprofen 200 MG Oral Cap As needed      pantoprazole 40 MG Oral Tab EC Take 1 tablet (40 mg total) by mouth.       Modified Medications    No medications on file     Medications Discontinued During This Encounter   Medication Reason    tretinoin 0.025 % External Cream Therapy completed     ??  Allergies:  No Known Allergies  ?  REVIEW OF  SYSTEMS   ?  Review of Systems   Constitutional:  Positive for malaise/fatigue. Negative for chills and fever.   HENT:  Negative for congestion and nosebleeds.    Eyes:  Positive for pain. Negative for blurred vision and redness.   Respiratory:  Negative for cough, hemoptysis and shortness of breath.    Cardiovascular:  Negative for chest pain, palpitations and leg swelling.   Gastrointestinal:  Positive for heartburn (intermittently). Negative for abdominal pain, blood in stool, constipation, diarrhea and nausea.   Genitourinary:  Negative for dysuria, frequency, hematuria and urgency.   Musculoskeletal:  Positive for back pain, joint pain, myalgias and neck pain.   Skin:  Negative for itching and rash.   Neurological:  Positive for tingling (sciatcia). Negative for dizziness, seizures, weakness and headaches.   Endo/Heme/Allergies:  Positive for environmental allergies. Does not bruise/bleed easily.   Psychiatric/Behavioral:  Negative for depression. The patient has insomnia (not always due to the pain). The patient is not nervous/anxious.      PHYSICAL EXAM   Today's Vitals:  Temperature Blood Pressure Heart Rate Resp Rate SpO2   Temp: 98.4 °F (36.9 °C) BP: 92/60 Pulse: 81 Resp: 18 SpO2: 99 %   ?  Current Weight Height BMI BSA Pain   Wt Readings from Last 1 Encounters:   10/02/24 113 lb (51.3 kg)    Height: 4' 11\" (149.9 cm) Body mass index is 22.82 kg/m². Body surface area is 1.45 meters squared.         Physical Exam  Vitals and nursing note reviewed.   Constitutional:       General: She is not in acute distress.     Appearance: Normal appearance. She is well-developed. She is not diaphoretic.   HENT:      Head: Normocephalic.   Eyes:      General: No scleral icterus.     Extraocular Movements: Extraocular movements intact.      Conjunctiva/sclera: Conjunctivae normal.   Neck:      Vascular: No JVD.      Trachea: No tracheal deviation.   Cardiovascular:      Rate and Rhythm: Normal rate and regular rhythm.       Heart sounds: Normal heart sounds. No murmur heard.  Pulmonary:      Effort: Pulmonary effort is normal. No respiratory distress.      Breath sounds: Normal breath sounds. No wheezing.   Musculoskeletal:         General: No swelling, tenderness or deformity.      Comments: No swelling, tenderness, redness or restriction of motion of the DIPs, PIPs, MCPs, wrists, elbows, ankles, or joints of the feet.  Bilateral shoulders with full ROM, no evidence of impingement with provocative maneuvers.  Bilateral knees without medial joint line tenderness, no crepitus, no effusion.  (Prior exam-SI joints tender. No tenderness over the greater trochanters. Spinous process tenderness over cervial and lumbar spine. Significant paraspinal muscle tightness/tenderness)   Skin:     General: Skin is warm and dry.      Findings: No erythema or rash.      Comments: No malar rash  No periungal erythema  No fingernail pitting/onycholysis    Neurological:      Mental Status: She is alert and oriented to person, place, and time.      Cranial Nerves: No cranial nerve deficit.      Gait: Gait normal.   Psychiatric:         Mood and Affect: Mood normal.         Behavior: Behavior normal.       ?  Radiology review:  PROCEDURE:  XR CHEST PA + LAT CHEST (CPT=71046)     INDICATIONS:  G89.29 Chronic pain of both knees M25.562 Chronic pain of both knees M25.561 Chronic pain of both knees H04.123 Dry eyes R79.82 Elevated C-reactive protein (CR*     COMPARISON:  None.     TECHNIQUE:  PA and lateral chest radiographs were obtained.     PATIENT STATED HISTORY: (As transcribed by Technologist)  Patient offered no additional history at this time.         FINDINGS:    LUNGS:  No focal consolidation.  Normal vascularity.  CARDIAC:  Normal size cardiac silhouette.  MEDIASTINUM:  Normal.  PLEURA:  Normal.  No pleural effusions.  BONES:  Normal for age.                   Impression   CONCLUSION:  There is no evidence of active cardiopulmonary disease.         LOCATION:  [facility]        Dictated by (CST): Rashel Francis MD on 9/01/2023 at 4:20 PM      Finalized by (CST): Rashel Francis MD on 9/01/2023 at 4:21 PM        Narrative   PROCEDURE:  XR SACROILIAC JOINTS (MIN 3 VIEWS) (CPT=72202)     INDICATIONS:  G89.29 Chronic pain of both knees M25.562 Chronic pain of both knees M25.561 Chronic pain of both knees H04.123 Dry eyes R79.82 Elevated C-reactive protein (CR*     COMPARISON:  None.     TECHNIQUE:  Frontal and oblique of the sacroiliac joints were obtained.     PATIENT STATED HISTORY: (As transcribed by Technologist)  Chronic low back pain. No recent injury. History of lumbar spine injuries.         FINDINGS:  Sacroiliac joints are intact.  Bony structures are unremarkable.                   Impression   CONCLUSION:  Unremarkable radiographs of sacroiliac joints.        LOCATION:  [facility]                  Dictated by (CST): Rashel Francis MD on 9/01/2023 at 4:22 PM      Finalized by (CST): Rashel Francis MD on 9/01/2023 at 4:22 PM           Narrative   PROCEDURE:  XR CERVICAL SPINE COMPLETE W/FLEX + EXT (CPT=72052)     TECHNIQUE:  AP, lateral, obliques, coned down view, and flexion/extension views of the spine were obtained.     COMPARISON:  None.     INDICATIONS:  G89.29 Chronic pain of both knees M25.562 Chronic pain of both knees M25.561 Chronic pain of both knees H04.123 Dry eyes R79.82 Elevated C-reactive protein (CR*     PATIENT STATED HISTORY: (As transcribed by Technologist)  Chronic posterior neck pain radiating to left shoulder. Pain becoming more frequent in the past year. No injury.         FINDINGS:      BONES:  Normal.  No significant spondylosis, scoliosis, fracture, or visible bony lesion.  DISC SPACES:  There is mild narrowing at C5-C6.  There is mild encroachment on the left foramen at this level as well.  PARASPINOUS:  Negative.  No paraspinous abnormality is seen.  OTHER:  Negative.           Impression   CONCLUSION:  There is degenerative disc disease  at C5-C6 with mild encroachment on the left foramen.        LOCATION:  [facility]        Dictated by (CST): Rashel Francis MD on 9/01/2023 at 4:21 PM      Finalized by (CST): Rashel Francis MD on 9/01/2023 at 4:22 PM        Labs:  Lab Results   Component Value Date    WBC 8.7 09/30/2024    RBC 4.08 09/30/2024    HGB 12.2 09/30/2024    HCT 35.3 09/30/2024    .0 (H) 09/30/2024    MCV 86.5 09/30/2024    MCH 29.9 09/30/2024    MCHC 34.6 09/30/2024    RDW 13.2 09/30/2024    NEPRELIM 5.05 09/30/2024    NEPERCENT 58.0 09/30/2024    LYPERCENT 32.8 09/30/2024    MOPERCENT 6.1 09/30/2024    EOPERCENT 2.0 09/30/2024    BAPERCENT 0.9 09/30/2024    NE 5.05 09/30/2024    LYMABS 2.86 09/30/2024    MOABSO 0.53 09/30/2024    EOABSO 0.17 09/30/2024    BAABSO 0.08 09/30/2024     Lab Results   Component Value Date    GLU 94 09/30/2024    BUN 14 09/30/2024    CREATSERUM 0.85 09/30/2024    ANIONGAP 6 09/30/2024    CA 10.3 09/30/2024    OSMOCALC 284 09/30/2024    ALKPHO 39 09/30/2024    AST 15 09/30/2024    ALT 9 (L) 09/30/2024    BILT 0.4 09/30/2024    TP 7.5 09/30/2024    ALB 4.9 (H) 09/30/2024    GLOBULIN 2.6 09/30/2024     09/30/2024    K 4.1 09/30/2024     09/30/2024    CO2 26.0 09/30/2024       Additional Labs:  09/2024  ESR 13  CRP normal     3/2024  CBC with WBC 12.0; hemoglobin 11.7; platelet 553  CMP grossly normal  ESR 7 normal  CRP 2 normal (n<10)    09/01/2023  SPEP grossly negative  HLA-B27 negative  HODA screen negative  Serum ACE normal  Lysozyme normal   normal  ESR 23 borderline elevated  CRP 0.42 borderline elevated    03/2023  CRP 5.1 (N<10)  ESR 39 elevated  Uric acid 4.3 normal   RF negative   B12 385 normal   Vit D 35.3 normal   HODA neg but on IF cytoplasmic autoantibodies present     07/2022  ESR 22 borderline elevated  HODA screen negative   Uric acid 4.6 normal   RF negative     06/2022  ESR 45 elevated  CRP 14.9 (N<10) elevated    06/2022  ESR 44 elevated  CRP 16.1 (N<10) elevated    Ana  DO [place]  [facility]  10/02/2024

## 2024-10-04 ENCOUNTER — TELEPHONE (OUTPATIENT)
Dept: RHEUMATOLOGY | Facility: CLINIC | Age: 39
End: 2024-10-04

## 2024-10-04 NOTE — TELEPHONE ENCOUNTER
Faxed script, insurance information, and face sheet to compound pharmacy to [phone] on 10/4/2024    Sent to scanning

## 2025-03-22 DIAGNOSIS — M50.30 DDD (DEGENERATIVE DISC DISEASE), CERVICAL: ICD-10-CM

## 2025-03-22 DIAGNOSIS — M51.369 DDD (DEGENERATIVE DISC DISEASE), LUMBAR: ICD-10-CM

## 2025-03-22 DIAGNOSIS — M54.2 NECK PAIN: ICD-10-CM

## 2025-03-24 RX ORDER — CYCLOBENZAPRINE HCL 10 MG
10 TABLET ORAL NIGHTLY PRN
Qty: 30 TABLET | Refills: 0 | Status: SHIPPED | OUTPATIENT
Start: 2025-03-24

## 2025-03-24 NOTE — TELEPHONE ENCOUNTER
Cyclobenzaprine 10 mg    Future Appointments   Date Time Provider Department Center   10/8/2025  4:30 PM Ana Cheng DO [facility]     Last office visit: 10/2/2024    Last fill: 3/21/2024 30 tab, 0 refills    23-Sep-2022 18:42

## 2025-04-21 DIAGNOSIS — M51.369 DDD (DEGENERATIVE DISC DISEASE), LUMBAR: ICD-10-CM

## 2025-04-21 DIAGNOSIS — M50.30 DDD (DEGENERATIVE DISC DISEASE), CERVICAL: Primary | ICD-10-CM

## 2025-04-21 DIAGNOSIS — M54.2 NECK PAIN: ICD-10-CM

## 2025-04-21 RX ORDER — CYCLOBENZAPRINE HCL 10 MG
10 TABLET ORAL NIGHTLY PRN
Qty: 90 TABLET | Refills: 0 | Status: SHIPPED | OUTPATIENT
Start: 2025-04-21

## 2025-04-21 NOTE — TELEPHONE ENCOUNTER
LOV:  10/2/2024     Next Rheum Apt:10/8/2025 Ana Cheng DO    Last fill: cyclobenzaprine 10 mg  3/24/2025  90 tablets, 0 refills   Labs:   Lab Results   Component Value Date    CREATSERUM 0.85 09/30/2024    ALKPHO 39 09/30/2024    AST 15 09/30/2024    ALT 9 (L) 09/30/2024    BILT 0.4 09/30/2024    TP 7.5 09/30/2024    ALB 4.9 (H) 09/30/2024       Lab Results   Component Value Date    WBC 8.7 09/30/2024    HGB 12.2 09/30/2024    .0 (H) 09/30/2024    NEPRELIM 5.05 09/30/2024    NEPERCENT 58.0 09/30/2024    LYPERCENT 32.8 09/30/2024    NE 5.05 09/30/2024    LYMABS 2.86 09/30/2024     z

## 2025-04-21 NOTE — TELEPHONE ENCOUNTER
Spoke to patient regarding the below message:    Please call pt and clarify need for rx.     Ana Cheng, DO  [facility]  4/21/2025    Patient states that she received a message on her phone from [employer] telling her it was time to refill so agreed and requested the refill. She states that she only takes them as needed and she would like a refill if possible but if  does not want to refill it she is ok with that as well.